# Patient Record
Sex: MALE | Race: WHITE | Employment: UNEMPLOYED | URBAN - METROPOLITAN AREA
[De-identification: names, ages, dates, MRNs, and addresses within clinical notes are randomized per-mention and may not be internally consistent; named-entity substitution may affect disease eponyms.]

---

## 2017-06-26 ENCOUNTER — OFFICE VISIT (OUTPATIENT)
Dept: FAMILY MEDICINE CLINIC | Age: 32
End: 2017-06-26

## 2017-06-26 VITALS
HEART RATE: 87 BPM | SYSTOLIC BLOOD PRESSURE: 132 MMHG | WEIGHT: 182 LBS | RESPIRATION RATE: 18 BRPM | BODY MASS INDEX: 26.05 KG/M2 | OXYGEN SATURATION: 100 % | HEIGHT: 70 IN | TEMPERATURE: 98.2 F | DIASTOLIC BLOOD PRESSURE: 77 MMHG

## 2017-06-26 DIAGNOSIS — A41.9 SEPSIS, DUE TO UNSPECIFIED ORGANISM: Primary | ICD-10-CM

## 2017-06-26 DIAGNOSIS — F11.20 HEROIN ADDICTION (HCC): ICD-10-CM

## 2017-06-26 NOTE — PROGRESS NOTES
HISTORY OF PRESENT ILLNESS  Neal Martínez is a 33 y.o. male. Chief Complaint   Patient presents with    ED Follow-up     Seen at [facility] on 6-22-17, and 6-24-17, and left AMA. DX with positive blood culture, fever, cough, H/A, and Heroin use. Pt refuses hospital admission.  Shoulder Pain     bilateral with pain scale of 7/10       HPI  Pt here for f/u of ER visits at [facility].   Pt had initially sought care on 6/22/17 due to ha. He had a sz while there and after eval, was d/c'ed to home with instructions to f/u with pcp and neuro. He was called back due to + blood cx. Pt left AMA from his 2nd visit but was started on oral abx. Pt presents here today only concerned about his shoulder pain. Pt is accompanied by mother who would like him to address elevated bp readings. Pt became upset with his mother initially and asked [facility] are you here? \"  Pt advised to speak more respectfully to his mother as she obviously cares about his wellbeing. Pt advised that I will not prescribe pain meds. He states that he has not been using marijuana but will have to use it and \"violate\" if not given a prescription for pain meds. Pt was clearly unhappy about this. Pt advised that he needs to return to the hospital for appropriate tx of his sepsis to prevent septic shock and death. Pt declines. Pt left the exam room and headed to the front of the ofc. Pt's mother was left in the room with me and began to express her concerns. She thinks he will not go in the hospital due to concerns about going through withdrawal from heroin. Mom advised that he would be medically treated for withdrawal sx and referred for rehab when cleared for admission, should he desire rehab. At this point pt re-entered the room and told his mother that she was not allowed to speak about him or his care. Pt's mother left with pt.     Review of Systems   Unable to perform ROS: Psychiatric disorder   Musculoskeletal: Positive for joint pain. Physical Exam   Constitutional: He appears well-developed and well-nourished. No distress. HENT:   Head: Normocephalic and atraumatic. Right Ear: External ear normal.   Left Ear: External ear normal.   Nose: Nose normal.   Eyes: Conjunctivae and EOM are normal.   Neck: Normal range of motion. Pulmonary/Chest: Effort normal.   Musculoskeletal: Normal range of motion. Neurological: He is alert. Coordination normal.   Skin: Skin is dry. Psychiatric: His speech is normal. His affect is angry. He is agitated. He is not aggressive and not combative. Cognition and memory are impaired. He expresses impulsivity. Nursing note and vitals reviewed. ASSESSMENT and PLAN  Porter West was seen today for ed follow-up and shoulder pain. Diagnoses and all orders for this visit:    Sepsis, due to unspecified organism [facility])    Heroin addiction ([facility] [address]    Attempted to convey importance of further care. Pt only interested in getting pain meds. Pt exited prior to exam.  Pt declines hospital admission.       Follow-up Disposition: unclear if pt will return

## 2017-06-26 NOTE — PROGRESS NOTES
Jerry Bernabe 33 y.o. male   Chief Complaint   Patient presents with    ED Follow-up     Seen at [facility] on 6-22-17, and 6-24-17, and left AMA. DX with positive blood culture, fever, cough, H/A, and Heroin use. Pt refuses hospital admission.  Shoulder Pain     bilateral with pain scale of 7/10         1. Have you been to the ER, urgent care clinic since your last visit? Hospitalized since your last visit? Yes When: 6/22& 6/24 Where: [facility] Reason for visit: positive blood culture, H/A, fever, drug use    2. Have you seen or consulted any other health care providers outside of the [address] [place] [address] since your last visit? Include any pap smears or colon screening.  No

## 2017-07-24 ENCOUNTER — TELEPHONE (OUTPATIENT)
Dept: FAMILY MEDICINE CLINIC | Age: 32
End: 2017-07-24

## 2017-07-24 NOTE — TELEPHONE ENCOUNTER
Pt has scheduled an appointment with Dr. Annette Bryant Infectious Disease [phone] for 8/3/17 and wants records faxed.

## 2017-12-28 ENCOUNTER — HOSPITAL ENCOUNTER (INPATIENT)
Age: 32
LOS: 2 days | Discharge: HOME | DRG: 753 | End: 2017-12-30
Attending: STUDENT IN AN ORGANIZED HEALTH CARE EDUCATION/TRAINING PROGRAM | Admitting: PSYCHIATRY & NEUROLOGY
Payer: MEDICAID

## 2017-12-28 PROBLEM — F39 MOOD DISORDER (HCC): Status: ACTIVE | Noted: 2017-12-28

## 2017-12-28 PROCEDURE — 74011250637 HC RX REV CODE- 250/637: Performed by: PSYCHIATRY & NEUROLOGY

## 2017-12-28 PROCEDURE — 65220000003 HC RM SEMIPRIVATE PSYCH

## 2017-12-28 RX ORDER — ZIPRASIDONE HYDROCHLORIDE 40 MG/1
40 CAPSULE ORAL 2 TIMES DAILY WITH MEALS
Status: DISCONTINUED | OUTPATIENT
Start: 2017-12-28 | End: 2017-12-30 | Stop reason: HOSPADM

## 2017-12-28 RX ORDER — IBUPROFEN 400 MG/1
400 TABLET ORAL
Status: DISCONTINUED | OUTPATIENT
Start: 2017-12-28 | End: 2017-12-30 | Stop reason: HOSPADM

## 2017-12-28 RX ORDER — LORAZEPAM 1 MG/1
1-2 TABLET ORAL
Status: DISCONTINUED | OUTPATIENT
Start: 2017-12-28 | End: 2017-12-30 | Stop reason: HOSPADM

## 2017-12-28 RX ORDER — HALOPERIDOL 5 MG/1
5 TABLET ORAL
Status: DISCONTINUED | OUTPATIENT
Start: 2017-12-28 | End: 2017-12-30 | Stop reason: HOSPADM

## 2017-12-28 RX ORDER — HALOPERIDOL 5 MG/ML
5 INJECTION INTRAMUSCULAR
Status: DISCONTINUED | OUTPATIENT
Start: 2017-12-28 | End: 2017-12-30 | Stop reason: HOSPADM

## 2017-12-28 RX ORDER — LORAZEPAM 2 MG/ML
1-2 INJECTION INTRAMUSCULAR
Status: DISCONTINUED | OUTPATIENT
Start: 2017-12-28 | End: 2017-12-30 | Stop reason: HOSPADM

## 2017-12-28 RX ORDER — TRAZODONE HYDROCHLORIDE 50 MG/1
50 TABLET ORAL
Status: DISCONTINUED | OUTPATIENT
Start: 2017-12-28 | End: 2017-12-30 | Stop reason: HOSPADM

## 2017-12-28 RX ORDER — CLONAZEPAM 0.5 MG/1
1 TABLET ORAL 2 TIMES DAILY
Status: DISCONTINUED | OUTPATIENT
Start: 2017-12-28 | End: 2017-12-30 | Stop reason: HOSPADM

## 2017-12-28 RX ADMIN — CLONAZEPAM 1 MG: 0.5 TABLET ORAL at 20:29

## 2017-12-28 RX ADMIN — QUETIAPINE FUMARATE 800 MG: 100 TABLET ORAL at 20:28

## 2017-12-28 RX ADMIN — TRAZODONE HYDROCHLORIDE 50 MG: 50 TABLET ORAL at 20:29

## 2017-12-28 RX ADMIN — LAMOTRIGINE 125 MG: 100 TABLET ORAL at 20:29

## 2017-12-28 RX ADMIN — ZIPRASIDONE HYDROCHLORIDE 40 MG: 40 CAPSULE ORAL at 17:39
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
## 2017-12-28 NOTE — IP AVS SNAPSHOT
[address] [facility] [address] 
 
 
 [address] Patient: Brijesh Taylor MRN: [id number] AZ:[birth year] About your hospitalization You were admitted on:  December 28, 2017 You last received care in the:  [facility] You were discharged on:  December 30, 2017 Why you were hospitalized Your primary diagnosis was:  Not on File Your diagnoses also included:  Mood Disorder (Hcc), Anxiety Things You Need To Do (next 8 weeks) Follow up with Martin Murdock MD  
  
Phone:  [phone] Where:  [address], [address] [facility] Follow up with Where:  Patient is scheduled with Dr. Marcial Rosales on 1/2/18 @ 6pm with [facility] [address] [facility] 4 [facility], [address] [place] [address] [id number] [place] [address],[facility] [address] Discharge Orders None A check darian indicates which time of day the medication should be taken. My Medications STOP taking these medications ADDERALL 30 mg tablet Generic drug:  dextroamphetamine-amphetamine ATIVAN 1 mg tablet Generic drug:  LORazepam  
   
  
 HYDROcodone-acetaminophen 5-325 mg per tablet Commonly known as:  NORCO  
   
  
 SEROquel  mg sr tablet Generic drug:  QUEtiapine SR Replaced by:  QUEtiapine 400 mg tablet  
   
  
 traZODone 50 mg tablet Commonly known as:  DESYREL  
   
  
 XANAX 2 mg tablet Generic drug:  ALPRAZolam  
   
  
  
TAKE these medications as instructed Instructions Each Dose to Equal  
 Morning Noon Evening Bedtime  
 lamoTRIgine 150 mg tablet Commonly known as: LaMICtal  
   
Your last dose was: Your next dose is: Take 1 Tab by mouth nightly. Indications: seizures 150 mg  
    
   
   
   
  
 prazosin 1 mg capsule Commonly known as:  MINIPRESS Your last dose was: Your next dose is: Take 1 Cap by mouth nightly. Indications: CHRONIC PTSD WITH TRAUMA NIGHTMARES  
 1 mg QUEtiapine 400 mg tablet Commonly known as:  SEROquel Your last dose was: Your next dose is: Take 2 Tabs by mouth nightly. Indications: BIPOLAR DISORDER IN REMISSION  
 800 mg  
    
   
   
   
  
 ziprasidone 80 mg capsule Commonly known as:  Neena Sears Your last dose was: Your next dose is: Take 1 Cap by mouth daily (with dinner). Indications: BIPOLAR DISORDER IN REMISSION 40 mg Where to Get Your Medications Information on where to get these meds will be given to you by the nurse or doctor. ! Ask your nurse or doctor about these medications  
  lamoTRIgine 150 mg tablet  
 prazosin 1 mg capsule QUEtiapine 400 mg tablet  
 ziprasidone 80 mg capsule Discharge Instructions BEHAVIORAL HEALTH NURSING DISCHARGE NOTE The following personal items collected during your admission are returned to you:  
Dental Appliance: Dental Appliances: None Vision: Visual Aid: None Hearing Aid:   
Jewelry: Jewelry: None Clothing:   
Other Valuables:   
Valuables sent to safe:   
 
 
PATIENT INSTRUCTIONS: 
 
The discharge information has been reviewed with the patient. The patient verbalized understanding. BEHAVIORAL HEALTH NURSING DISCHARGE NOTE The following personal items collected during your admission are returned to you:  
Dental Appliance: Dental Appliances: None Vision: Visual Aid: None Hearing Aid:   
Jewelry: Jewelry: None Clothing:   
Other Valuables:   
Valuables sent to safe:   
 
 
PATIENT INSTRUCTIONS: 
 
The discharge information has been reviewed with the patient. The patient verbalized understanding. Anxiety Disorder: Care Instructions Your Care Instructions Anxiety is a normal reaction to stress.  Difficult situations can cause you to have symptoms such as sweaty palms and a nervous feeling. In an anxiety disorder, the symptoms are far more severe. Constant worry, muscle tension, trouble sleeping, nausea and diarrhea, and other symptoms can make normal daily activities difficult or impossible. These symptoms may occur for no reason, and they can affect your work, school, or social life. Medicines, counseling, and self-care can all help. Follow-up care is a key part of your treatment and safety. Be sure to make and go to all appointments, and call your doctor if you are having problems. It's also a good idea to know your test results and keep a list of the medicines you take. How can you care for yourself at home? · Take medicines exactly as directed. Call your doctor if you think you are having a problem with your medicine. · Go to your counseling sessions and follow-up appointments. · Recognize and accept your anxiety. Then, when you are in a situation that makes you anxious, say to yourself, \"This is not an emergency. I feel uncomfortable, but I am not in danger. I can keep going even if I feel anxious. \" · Be kind to your body: ¨ Relieve tension with exercise or a massage. ¨ Get enough rest. 
¨ Avoid alcohol, caffeine, nicotine, and illegal drugs. They can increase your anxiety level and cause sleep problems. ¨ Learn and do relaxation techniques. See below for more about these techniques. · Engage your mind. Get out and do something you enjoy. Go to a funny movie, or take a walk or hike. Plan your day. Having too much or too little to do can make you anxious. · Keep a record of your symptoms. Discuss your fears with a good friend or family member, or join a support group for people with similar problems. Talking to others sometimes relieves stress. · Get involved in social groups, or volunteer to help others. Being alone sometimes makes things seem worse than they are. · Get at least 30 minutes of exercise on most days of the week to relieve stress. Walking is a good choice. You also may want to do other activities, such as running, swimming, cycling, or playing tennis or team sports. Relaxation techniques Do relaxation exercises 10 to 20 minutes a day. You can play soothing, relaxing music while you do them, if you wish. · Tell others in your house that you are going to do your relaxation exercises. Ask them not to disturb you. · Find a comfortable place, away from all distractions and noise. · Lie down on your back, or sit with your back straight. · Focus on your breathing. Make it slow and steady. · Breathe in through your nose. Breathe out through either your nose or mouth. · Breathe deeply, filling up the area between your navel and your rib cage. Breathe so that your belly goes up and down. · Do not hold your breath. · Breathe like this for 5 to 10 minutes. Notice the feeling of calmness throughout your whole body. As you continue to breathe slowly and deeply, relax by doing the following for another 5 to 10 minutes: · Tighten and relax each muscle group in your body. You can begin at your toes and work your way up to your head. · Imagine your muscle groups relaxing and becoming heavy. · Empty your mind of all thoughts. · Let yourself relax more and more deeply. · Become aware of the state of calmness that surrounds you. · When your relaxation time is over, you can bring yourself back to alertness by moving your fingers and toes and then your hands and feet and then stretching and moving your entire body. Sometimes people fall asleep during relaxation, but they usually wake up shortly afterward. · Always give yourself time to return to full alertness before you drive a car or do anything that might cause an accident if you are not fully alert. Never play a relaxation tape while you drive a car. When should you call for help? Call 911 anytime you think you may need emergency care. For example, call if: 
? · You feel you cannot stop from hurting yourself or someone else. ? Keep the numbers for these national suicide hotlines: 5-625-491-TALK (0-901.470.4988) and 4-808-DRJZIVP (7-423.244.1378). If you or someone you know talks about suicide or feeling hopeless, get help right away. ? Watch closely for changes in your health, and be sure to contact your doctor if: 
? · You have anxiety or fear that affects your life. ? · You have symptoms of anxiety that are new or different from those you had before. Where can you learn more? Go to http://abhinav-bradley.info/. Enter P754 in the search box to learn more about \"Anxiety Disorder: Care Instructions. \" Current as of: May 12, 2017 Content Version: 11.4 © 4461-4266 iXpert. Care instructions adapted under license by [employer] (which disclaims liability or warranty for this information). If you have questions about a medical condition or this instruction, always ask your healthcare professional. [place] [address] any warranty or liability for your use of this information. Learning About Mood Disorders What are mood disorders? Mood disorders are medical problems that affect how you feel. They can impact your moods, thoughts, and actions. Mood disorders include: · Depression. This causes you to feel sad or hopeless for much of the time. · Bipolar disorder. This causes extreme mood changes from manic episodes of very high energy to extreme lows of depression. · Seasonal affective disorder (SAD). This is a type of depression that affects you during the same season each year. Most often people experience SAD during the fall and winter months when days are shorter and there is less light. What are the symptoms? Depression You may: · Feel sad or hopeless nearly every day. · Lose interest in or not get pleasure from most daily activities. You feel this way nearly every day. · Have low energy, changes in your appetite, or changes in how well you sleep. · Have trouble concentrating. · Think about death and suicide. Keep the numbers for these national suicide hotlines: 1-102-827-TALK (5-959.702.3860) and 2-843-COTKRUS (2-964.128.9142). If you or someone you know talks about suicide or feeling hopeless, get help right away. Bipolar disorder Symptoms depend on your mood swings. You may: · Feel very happy, energetic, or on edge. · Feel like you need very little sleep. · Feel overly self-confident. · Do impulsive things, such as spending a lot of money. · Feel sad or hopeless. · Have racing thoughts or trouble thinking and making decisions. · Lose interest in things you have enjoyed in the past. 
· Think about death and suicide. Keep the numbers for these national suicide hotlines: 4-302-133-TALK (4-132.828.3005) and 3-249-YAWLWYP (6-477.424.6451). If you or someone you know talks about suicide or feeling hopeless, get help right away. Seasonal affective disorder (SAD) Symptoms come and go at about the same time each year. For most people with SAD, symptoms come during the winter when there is less daylight. You may: · Feel sad, grumpy, posadas, or anxious. · Lose interest in your usual activities. · Eat more and crave carbohydrates, such as bread and pasta. · Gain weight. · Sleep more and feel drowsy during the daytime. How are mood disorders treated? Mood disorders can be treated with medicines or counseling, or a combination of both. Medicines for depression and SAD may include antidepressants. Medicines for bipolar disorder may include: · Mood stabilizers. · Antipsychotics. · Benzodiazepines. Counseling may involve cognitive-behavioral therapy. It teaches you how to change the ways you think and behave.  This can help you stop thinking bad thoughts about yourself and your life. Light therapy is the main treatment for SAD. This therapy uses a special kind of lamp. You let the lamp shine on you at certain times, usually in the morning. This may help your symptoms during the months when there is less sunlight. Healthy lifestyle Healthy lifestyle changes may help you feel better. · Get at least 30 minutes of exercise on most days of the week. Walking is a good choice. · Eat a healthy diet. Include fruits, vegetables, lean proteins, and whole grains in your diet each day. · Keep a regular sleep schedule. Try for 8 hours of sleep a night. · Find ways to manage stress, such as relaxation exercises. · Avoid alcohol and illegal drugs. Follow-up care is a key part of your treatment and safety. Be sure to make and go to all appointments, and call your doctor if you are having problems. It's also a good idea to know your test results and keep a list of the medicines you take. Where can you learn more? Go to http://abhinav-bradley.info/. Enter S584 in the search box to learn more about \"Learning About Mood Disorders. \" Current as of: May 12, 2017 Content Version: 11.4 © 7418-4985 Envysion. Care instructions adapted under license by [employer] (which disclaims liability or warranty for this information). If you have questions about a medical condition or this instruction, always ask your healthcare professional. [address] any warranty or liability for your use of this information. Introducing [facility]! Teresa Roland introduces [employer] patient portal. Now you can access parts of your medical record, email your doctor's office, and request medication refills online. 1. In your internet browser, go to [email or web address] 2. Click on the First Time User? Click Here link in the Sign In box. You will see the New Member Sign Up page. 3. Enter your Zones Access Code exactly as it appears below. You will not need to use this code after youve completed the sign-up process. If you do not sign up before the expiration date, you must request a new code. · Zones Access Code: [email or web address] Expires: 3/29/2018  7:33 AM 
 
4. Enter the last four digits of your Social Security Number (xxxx) and Date of Birth (mm/dd/yyyy) as indicated and click Submit. You will be taken to the next sign-up page. 5. Create a Zones ID. This will be your Zones login ID and cannot be changed, so think of one that is secure and easy to remember. 6. Create a Zones password. You can change your password at any time. 7. Enter your Password Reset Question and Answer. This can be used at a later time if you forget your password. 8. Enter your e-mail address. You will receive e-mail notification when new information is available in [address] 9. Click Sign Up. You can now view and download portions of your medical record. 10. Click the Download Summary menu link to download a portable copy of your medical information. If you have questions, please visit the Frequently Asked Questions section of the Zones website. Remember, Zones is NOT to be used for urgent needs. For medical emergencies, dial 911. Now available from your iPhone and Android! Providers Seen During Your Hospitalization Provider Specialty Primary office phone Lamont Manley MD Psychiatry [phone] Your Primary Care Physician (PCP) Primary Care Physician Office Phone Office Fax [facility] [phone] You are allergic to the following No active allergies Recent Documentation Smoking Status Current Every Day Smoker Emergency Contacts Name Discharge Info Relation Home Work Mobile [address] CAREGIVER [3] Spouse [3] [phone] [address]  Other Relative [6] [phone] Stephanie Bentley  Friend [5] [phone] Patient Belongings The following personal items are in your possession at time of discharge: 
  Dental Appliances: None  Visual Aid: None      Home Medications: None   Jewelry: None Please provide this summary of care documentation to your next provider. Signatures-by signing, you are acknowledging that this After Visit Summary has been reviewed with you and you have received a copy. Patient Signature:  ____________________________________________________________ Date:  ____________________________________________________________  
  
[facility] Sleeper Provider Signature:  ____________________________________________________________ Date:  ____________________________________________________________

## 2017-12-29 PROCEDURE — 74011250637 HC RX REV CODE- 250/637: Performed by: PSYCHIATRY & NEUROLOGY

## 2017-12-29 PROCEDURE — 74011250637 HC RX REV CODE- 250/637: Performed by: STUDENT IN AN ORGANIZED HEALTH CARE EDUCATION/TRAINING PROGRAM

## 2017-12-29 PROCEDURE — 65220000003 HC RM SEMIPRIVATE PSYCH

## 2017-12-29 RX ORDER — PRAZOSIN HYDROCHLORIDE 1 MG/1
1 CAPSULE ORAL
Status: DISCONTINUED | OUTPATIENT
Start: 2017-12-29 | End: 2017-12-30 | Stop reason: HOSPADM

## 2017-12-29 RX ADMIN — HALOPERIDOL 5 MG: 5 TABLET ORAL at 10:27

## 2017-12-29 RX ADMIN — CLONAZEPAM 1 MG: 0.5 TABLET ORAL at 08:40

## 2017-12-29 RX ADMIN — HALOPERIDOL 5 MG: 5 TABLET ORAL at 05:30

## 2017-12-29 RX ADMIN — QUETIAPINE FUMARATE 800 MG: 100 TABLET ORAL at 20:07

## 2017-12-29 RX ADMIN — ZIPRASIDONE HYDROCHLORIDE 40 MG: 40 CAPSULE ORAL at 17:00

## 2017-12-29 RX ADMIN — CLONAZEPAM 1 MG: 0.5 TABLET ORAL at 20:06

## 2017-12-29 RX ADMIN — ZIPRASIDONE HYDROCHLORIDE 40 MG: 40 CAPSULE ORAL at 08:40

## 2017-12-29 RX ADMIN — LORAZEPAM 1 MG: 1 TABLET ORAL at 16:27

## 2017-12-29 RX ADMIN — LAMOTRIGINE 125 MG: 100 TABLET ORAL at 20:06

## 2017-12-29 RX ADMIN — PRAZOSIN HYDROCHLORIDE 1 MG: 1 CAPSULE ORAL at 20:08
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
## 2017-12-29 NOTE — BSMART NOTE
Patient states he feels he is doing well and would like to be discharged. States his Father visited him last night and all went well. He will be returning to Parents home after discharged. Patient stated he spent several days at [facility] prior to his transfer here and feels he is now stable for discharge.

## 2017-12-29 NOTE — BSMART NOTE
SOCIAL WORK GROUP THERAPY PROGRESS NOTE    Group Time:  1:15pm    Group Topic:  Coping Skills    Group Participation:     Pt expressed not interested in attending session despite staff support

## 2017-12-29 NOTE — PROGRESS NOTES
Patient has been pleasant today. Request haldol earlier this morning . States \" it helps me stay calm\". Social with select peers.

## 2017-12-29 NOTE — BSMART NOTE
ART THERAPY GROUP PROGRESS NOTE    PATIENT SCHEDULED FOR GROUP AT: 13:45    ATTENDANCE: Full    PARTICIPATION LEVEL: Participates fully in the art process    ATTENTION LEVEL: Able to focus on task    FOCUS: Reality orientation     SYMBOLIC & THEMATIC CONTENT AS NOTED IN IMAGERY: He was calm, compliant, and invested in the task at hand. He occasionally interacted with group members and offered encouragement. His imagery was a bit bizarre and associations were general with a vague quality.

## 2017-12-29 NOTE — BH NOTES
GROUP THERAPY PROGRESS NOTE    Eloise Sheldon is participating in [place].      Group time: 30 minutes    Personal goal for participation: have good day    Goal orientation: community    Group therapy participation: minimal    Therapeutic interventions reviewed and discussed: goals and procedures    Impression of participation: encouraged

## 2017-12-29 NOTE — BH NOTES
Patient made aware of medication left at [facility] and to notify them and that he needs to pick his medication up in 2 weeks, the patient stated ok.

## 2017-12-29 NOTE — BSMART NOTE
OCCUPATIONAL THERAPY PROGRESS NOTE    Group Time:  2117  Attendance: The patient attended 1/2 of group. Called out. Participation:  The patient participated with moderate elaboration in the activity. Attention:  The patient needed redirection to activity at least once. Interaction:  The patient occasionally  interacts with others. Participated as called on. Answers appropriate to question.

## 2017-12-29 NOTE — BH NOTES
GROUP THERAPY PROGRESS NOTE    Malinda Chau is participating in Recreational Therapy. Group time: 30 minutes    Goal orientation: social    Group therapy participation: active    Therapeutic interventions reviewed and discussed: Snack and movies in day room. Impression of participation: Patient participated in day room.

## 2017-12-30 VITALS
HEART RATE: 80 BPM | SYSTOLIC BLOOD PRESSURE: 112 MMHG | RESPIRATION RATE: 16 BRPM | DIASTOLIC BLOOD PRESSURE: 72 MMHG | TEMPERATURE: 96.8 F

## 2017-12-30 PROBLEM — F41.9 ANXIETY: Status: ACTIVE | Noted: 2017-12-30

## 2017-12-30 PROCEDURE — 74011250637 HC RX REV CODE- 250/637: Performed by: PSYCHIATRY & NEUROLOGY

## 2017-12-30 RX ORDER — QUETIAPINE FUMARATE 400 MG/1
800 TABLET, FILM COATED ORAL
Qty: 60 TAB | Refills: 0 | Status: SHIPPED | OUTPATIENT
Start: 2017-12-30

## 2017-12-30 RX ORDER — ZIPRASIDONE HYDROCHLORIDE 80 MG/1
40 CAPSULE ORAL
Qty: 30 CAP | Refills: 0 | Status: SHIPPED | OUTPATIENT
Start: 2017-12-30

## 2017-12-30 RX ORDER — LAMOTRIGINE 150 MG/1
150 TABLET ORAL
Qty: 30 TAB | Refills: 0 | Status: SHIPPED | OUTPATIENT
Start: 2017-12-30

## 2017-12-30 RX ORDER — PRAZOSIN HYDROCHLORIDE 1 MG/1
1 CAPSULE ORAL
Qty: 30 CAP | Refills: 0 | Status: SHIPPED | OUTPATIENT
Start: 2017-12-30

## 2017-12-30 RX ADMIN — LORAZEPAM 1 MG: 1 TABLET ORAL at 09:34

## 2017-12-30 RX ADMIN — CLONAZEPAM 1 MG: 0.5 TABLET ORAL at 08:12

## 2017-12-30 RX ADMIN — ZIPRASIDONE HYDROCHLORIDE 40 MG: 40 CAPSULE ORAL at 08:12

## 2017-12-30 RX ADMIN — LORAZEPAM 2 MG: 1 TABLET ORAL at 06:25

## 2017-12-30 NOTE — BH NOTES
BEHAVIORAL HEALTH NURSING DISCHARGE NOTE      No personal items collected during your admission are returned to you: Items were picked up by family member 12/29/17 per patient by father. PATIENT INSTRUCTIONS:  Crisis Hotline: 8-708.725.2152. [facility] Emergency Services: [phone]. The discharge information has been reviewed with the patient. The patient verbalized understanding.       Patient armband removed and shredded

## 2017-12-30 NOTE — BH NOTES
Patient Participated in group today, he consumed all his food and took his medication he did not display any negative or aggressive behavior staff will continue to monitor patient for health and safety.

## 2017-12-30 NOTE — DISCHARGE INSTRUCTIONS
BEHAVIORAL HEALTH NURSING DISCHARGE NOTE      The following personal items collected during your admission are returned to you:   Dental Appliance: Dental Appliances: None  Vision: Visual Aid: None  Hearing Aid:    Jewelry: Jewelry: None  Clothing:    Other Valuables:    Valuables sent to safe:        PATIENT INSTRUCTIONS:    The discharge information has been reviewed with the patient. The patient verbalized understanding. BEHAVIORAL HEALTH NURSING DISCHARGE NOTE      The following personal items collected during your admission are returned to you:   Dental Appliance: Dental Appliances: None  Vision: Visual Aid: None  Hearing Aid:    Jewelry: Jewelry: None  Clothing:    Other Valuables:    Valuables sent to safe:        PATIENT INSTRUCTIONS:    The discharge information has been reviewed with the patient. The patient verbalized understanding. Anxiety Disorder: Care Instructions  Your Care Instructions    Anxiety is a normal reaction to stress. Difficult situations can cause you to have symptoms such as sweaty palms and a nervous feeling. In an anxiety disorder, the symptoms are far more severe. Constant worry, muscle tension, trouble sleeping, nausea and diarrhea, and other symptoms can make normal daily activities difficult or impossible. These symptoms may occur for no reason, and they can affect your work, school, or social life. Medicines, counseling, and self-care can all help. Follow-up care is a key part of your treatment and safety. Be sure to make and go to all appointments, and call your doctor if you are having problems. It's also a good idea to know your test results and keep a list of the medicines you take. How can you care for yourself at home? · Take medicines exactly as directed. Call your doctor if you think you are having a problem with your medicine. · Go to your counseling sessions and follow-up appointments. · Recognize and accept your anxiety.  Then, when you are in a situation that makes you anxious, say to yourself, \"This is not an emergency. I feel uncomfortable, but I am not in danger. I can keep going even if I feel anxious. \"  · Be kind to your body:  ¨ Relieve tension with exercise or a massage. ¨ Get enough rest.  ¨ Avoid alcohol, caffeine, nicotine, and illegal drugs. They can increase your anxiety level and cause sleep problems. ¨ Learn and do relaxation techniques. See below for more about these techniques. · Engage your mind. Get out and do something you enjoy. Go to a [employer] movie, or take a walk or hike. Plan your day. Having too much or too little to do can make you anxious. · Keep a record of your symptoms. Discuss your fears with a good friend or family member, or join a support group for people with similar problems. Talking to others sometimes relieves stress. · Get involved in social groups, or volunteer to help others. Being alone sometimes makes things seem worse than they are. · Get at least 30 minutes of exercise on most days of the week to relieve stress. Walking is a good choice. You also may want to do other activities, such as running, swimming, cycling, or playing tennis or team sports. Relaxation techniques  Do relaxation exercises 10 to 20 minutes a day. You can play soothing, relaxing music while you do them, if you wish. · Tell others in your house that you are going to do your relaxation exercises. Ask them not to disturb you. · Find a comfortable place, away from all distractions and noise. · Lie down on your back, or sit with your back straight. · Focus on your breathing. Make it slow and steady. · Breathe in through your nose. Breathe out through either your nose or mouth. · Breathe deeply, filling up the area between your navel and your rib cage. Breathe so that your belly goes up and down. · Do not hold your breath. · Breathe like this for 5 to 10 minutes. Notice the feeling of calmness throughout your whole body.   As you continue to breathe slowly and deeply, relax by doing the following for another 5 to 10 minutes:  · Tighten and relax each muscle group in your body. You can begin at your toes and work your way up to your head. · Imagine your muscle groups relaxing and becoming heavy. · Empty your mind of all thoughts. · Let yourself relax more and more deeply. · Become aware of the state of calmness that surrounds you. · When your relaxation time is over, you can bring yourself back to alertness by moving your fingers and toes and then your hands and feet and then stretching and moving your entire body. Sometimes people fall asleep during relaxation, but they usually wake up shortly afterward. · Always give yourself time to return to full alertness before you drive a car or do anything that might cause an accident if you are not fully alert. Never play a relaxation tape while you drive a car. When should you call for help? Call 911 anytime you think you may need emergency care. For example, call if:  ? · You feel you cannot stop from hurting yourself or someone else. ? Keep the numbers for these national suicide hotlines: 5-799-623-TALK (7-791.355.9115) and 1-936-TFUILMO (6-457.379.5464). If you or someone you know talks about suicide or feeling hopeless, get help right away. ? Watch closely for changes in your health, and be sure to contact your doctor if:  ? · You have anxiety or fear that affects your life. ? · You have symptoms of anxiety that are new or different from those you had before. Where can you learn more? Go to http://abhinav-bradley.info/. Enter P754 in the search box to learn more about \"Anxiety Disorder: Care Instructions. \"  Current as of: May 12, 2017  Content Version: 11.4  © 7888-7584 Healthwise, Incorporated. Care instructions adapted under license by [employer] (which disclaims liability or warranty for this information).  If you have questions about a medical condition or this instruction, always ask your healthcare professional. [address] any warranty or liability for your use of this information. Learning About Mood Disorders  What are mood disorders? Mood disorders are medical problems that affect how you feel. They can impact your moods, thoughts, and actions. Mood disorders include:  · Depression. This causes you to feel sad or hopeless for much of the time. · Bipolar disorder. This causes extreme mood changes from manic episodes of very high energy to extreme lows of depression. · Seasonal affective disorder (SAD). This is a type of depression that affects you during the same season each year. Most often people experience SAD during the fall and winter months when days are shorter and there is less light. What are the symptoms? Depression  You may:  · Feel sad or hopeless nearly every day. · Lose interest in or not get pleasure from most daily activities. You feel this way nearly every day. · Have low energy, changes in your appetite, or changes in how well you sleep. · Have trouble concentrating. · Think about death and suicide. Keep the numbers for these national suicide hotlines: 5-270-106-TALK (7-709-585-657.524.7725) and 3-656-FKWGUQA (4-702.343.2918). If you or someone you know talks about suicide or feeling hopeless, get help right away. Bipolar disorder  Symptoms depend on your mood swings. You may:  · Feel very happy, energetic, or on edge. · Feel like you need very little sleep. · Feel overly self-confident. · Do impulsive things, such as spending a lot of money. · Feel sad or hopeless. · Have racing thoughts or trouble thinking and making decisions. · Lose interest in things you have enjoyed in the past.  · Think about death and suicide. Keep the numbers for these national suicide hotlines: 1-208-637-TALK (1-850.334.2386) and 8-040-YZYJJFM (8-141.989.3451).  If you or someone you know talks about suicide or feeling hopeless, get help right away. Seasonal affective disorder (SAD)  Symptoms come and go at about the same time each year. For most people with SAD, symptoms come during the winter when there is less daylight. You may:  · Feel sad, grumpy, posadas, or anxious. · Lose interest in your usual activities. · Eat more and crave carbohydrates, such as bread and pasta. · Gain weight. · Sleep more and feel drowsy during the daytime. How are mood disorders treated? Mood disorders can be treated with medicines or counseling, or a combination of both. Medicines for depression and SAD may include antidepressants. Medicines for bipolar disorder may include:  · Mood stabilizers. · Antipsychotics. · Benzodiazepines. Counseling may involve cognitive-behavioral therapy. It teaches you how to change the ways you think and behave. This can help you stop thinking bad thoughts about yourself and your life. Light therapy is the main treatment for SAD. This therapy uses a special kind of lamp. You let the lamp shine on you at certain times, usually in the morning. This may help your symptoms during the months when there is less sunlight. Healthy lifestyle  Healthy lifestyle changes may help you feel better. · Get at least 30 minutes of exercise on most days of the week. Walking is a good choice. · Eat a healthy diet. Include fruits, vegetables, lean proteins, and whole grains in your diet each day. · Keep a regular sleep schedule. Try for 8 hours of sleep a night. · Find ways to manage stress, such as relaxation exercises. · Avoid alcohol and illegal drugs. Follow-up care is a key part of your treatment and safety. Be sure to make and go to all appointments, and call your doctor if you are having problems. It's also a good idea to know your test results and keep a list of the medicines you take. Where can you learn more? Go to http://abhinav-bradley.info/.   Enter R862 in the search box to learn more about \"Learning About Mood Disorders. \"  Current as of: May 12, 2017  Content Version: 11.4  © 3187-6354 Healthwise, Incorporated. Care instructions adapted under license by [employer] (which disclaims liability or warranty for this information). If you have questions about a medical condition or this instruction, always ask your healthcare professional. [place] [address] any warranty or liability for your use of this information.

## 2017-12-30 NOTE — PROGRESS NOTES
Problem: Suicide/Homicide (Adult/Pediatric)  Goal: *STG: Remains safe in hospital  Patient will remain free of harm to self and others every shift throughout hospitalization. Outcome: Progressing Towards Goal  Demonstrated safe behavior on unit; denied thoughts of harm to self or others. Goal: *STG/LTG: Complies with medication therapy  Patient will take medication as prescribed every shift throughout hospital stay. Outcome: Progressing Towards Goal  Patient is medication compliant. Comments: Patient is alert and oriented x 4; denied thoughts of harm to self or others; social with peers and staff; received Ativan 1 mg po x 1 this shift for c/o anxiety; will monitor and maintain safe environment.

## 2017-12-30 NOTE — BH NOTES
GROUP THERAPY PROGRESS NOTE    Michelle Ravi is participating in [employer].      Group time: 30 minutes    Personal goal for participation: discuss guideline compliance, unit issues and community announcements    Goal orientation: community    Group therapy participation: active
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
## 2018-04-11 ENCOUNTER — OFFICE VISIT (OUTPATIENT)
Dept: CARDIOLOGY CLINIC | Age: 33
End: 2018-04-11

## 2018-04-11 VITALS
BODY MASS INDEX: 27.22 KG/M2 | HEIGHT: 69 IN | WEIGHT: 183.8 LBS | HEART RATE: 92 BPM | DIASTOLIC BLOOD PRESSURE: 90 MMHG | SYSTOLIC BLOOD PRESSURE: 128 MMHG

## 2018-04-11 DIAGNOSIS — F17.200 SMOKER: ICD-10-CM

## 2018-04-11 DIAGNOSIS — R07.89 OTHER CHEST PAIN: Primary | ICD-10-CM

## 2018-04-11 RX ORDER — ASPIRIN 81 MG/1
TABLET ORAL DAILY
COMMUNITY

## 2018-04-11 RX ORDER — ALPRAZOLAM 2 MG/1
TABLET ORAL
COMMUNITY

## 2018-04-11 RX ORDER — FLUOXETINE HYDROCHLORIDE 20 MG/1
CAPSULE ORAL DAILY
COMMUNITY

## 2018-04-11 NOTE — MR AVS SNAPSHOT
[facility] 
 
 
 [address] [facility] 
[phone] Patient: Daphnie Fournier MRN: [id number] Visit Information Date & Time Provider Department Dept. Phone Encounter #  
 2018  3:00 PM Chencho Dewitt MD [facility] [phone] Follow-up Instructions Return in about 3 weeks (around 2018). Your Appointments 2018  3:00 PM  
New Patient with Chencho Dewitt MD  
[facility] ([address] [facility]) Appt Note: Self referral pt c/o arm and leg numbess and chest pressure; appt conf/aj  
 [address] [facility] [address]  
  
   
 [address] [place] [address] Upcoming Health Maintenance Date Due Pneumococcal 19-64 Medium Risk (1 of 1 - PPSV23) 3/3/2005 DTaP/Tdap/Td series (1 - Tdap) 3/3/2007 Allergies as of 2018  Review Complete On: 2018 By: Steven Matson No Known Allergies Current Immunizations  Never Reviewed No immunizations on file. Not reviewed this visit You Were Diagnosed With   
  
 Codes Comments Other chest pain    -  Primary ICD-10-CM: R07.89 ICD-9-CM: 786.59 Smoker     ICD-10-CM: R44.577 ICD-9-CM: 305.1 Vitals BP Pulse Height(growth percentile) Weight(growth percentile) BMI Smoking Status 128/90 92 5' 9\" (1.753 m) 183 lb 12.8 oz (83.4 kg) 27.14 kg/m2 Current Every Day Smoker Vitals History BMI and BSA Data Body Mass Index Body Surface Area  
 27.14 kg/m 2 2.01 m 2 Preferred Pharmacy Pharmacy Name Phone [facility], [address] [phone] Your Updated Medication List  
  
   
This list is accurate as of 18  9:31 AM.  Always use your most recent med list.  
  
  
  
  
 aspirin delayed-release 81 mg tablet Take  by mouth daily. lamoTRIgine 150 mg tablet Commonly known as: LaMICtal  
Take 1 Tab by mouth nightly. Indications: seizures  
  
 prazosin 1 mg capsule Commonly known as:  MINIPRESS Take 1 Cap by mouth nightly. Indications: CHRONIC PTSD WITH TRAUMA NIGHTMARES  
  
 PROzac 20 mg capsule Generic drug:  FLUoxetine Take  by mouth daily. QUEtiapine 400 mg tablet Commonly known as:  SEROquel Take 2 Tabs by mouth nightly. Indications: BIPOLAR DISORDER IN REMISSION  
  
 XANAX 2 mg tablet Generic drug:  ALPRAZolam  
Take  by mouth. ziprasidone 80 mg capsule Commonly known as:  Johney Faith Take 1 Cap by mouth daily (with dinner). Indications: BIPOLAR DISORDER IN REMISSION We Performed the Following AMB POC EKG ROUTINE W/ 12 LEADS, INTER & REP [70871 CPT(R)] Follow-up Instructions Return in about 3 weeks (around 5/2/2018). To-Do List   
 04/18/2018 Imaging:  XR RIBS LT UNI 2 V Patient Instructions Have chest xray done Follow up May 2, 2018 
11 am 
 
 
  
Introducing [facility]! Ginette Carroll introduces [employer] patient portal. Now you can access parts of your medical record, email your doctor's office, and request medication refills online. 1. In your internet browser, go to [email or web address] 2. Click on the First Time User? Click Here link in the Sign In box. You will see the New Member Sign Up page. 3. Enter your [employer] Access Code exactly as it appears below. You will not need to use this code after youve completed the sign-up process. If you do not sign up before the expiration date, you must request a new code. · [employer] Access Code: [email or web address] Expires: 7/10/2018  8:14 AM 
 
4. Enter the last four digits of your Social Security Number (xxxx) and Date of Birth (mm/dd/yyyy) as indicated and click Submit. You will be taken to the next sign-up page. 5. Create a [employer] ID.  This will be your [employer] login ID and cannot be changed, so think of one that is secure and easy to remember. 6. Create a [employer] password. You can change your password at any time. 7. Enter your Password Reset Question and Answer. This can be used at a later time if you forget your password. 8. Enter your e-mail address. You will receive e-mail notification when new information is available in [address] 9. Click Sign Up. You can now view and download portions of your medical record. 10. Click the Download Summary menu link to download a portable copy of your medical information. If you have questions, please visit the Frequently Asked Questions section of the [employer] website. Remember, [employer] is NOT to be used for urgent needs. For medical emergencies, dial 911. Now available from your iPhone and Android! Please provide this summary of care documentation to your next provider. If you have any questions after today's visit, please call [phone].
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
## 2021-06-01 ENCOUNTER — OFFICE VISIT (OUTPATIENT)
Dept: FAMILY MEDICINE CLINIC | Age: 36
End: 2021-06-01
Payer: MEDICAID
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
## 2021-06-01 DIAGNOSIS — E07.9 THYROID DISORDER: ICD-10-CM

## 2021-06-01 DIAGNOSIS — Z72.0 TOBACCO ABUSE DISORDER: ICD-10-CM

## 2021-06-01 DIAGNOSIS — F31.2 BIPOLAR AFFECTIVE DISORDER, CURRENT EPISODE MANIC WITH PSYCHOTIC SYMPTOMS (HCC): ICD-10-CM

## 2021-06-01 DIAGNOSIS — R06.02 SOB (SHORTNESS OF BREATH): ICD-10-CM

## 2021-06-01 DIAGNOSIS — F39 MOOD DISORDER (HCC): ICD-10-CM

## 2021-06-01 DIAGNOSIS — F20.9 SCHIZOPHRENIA, UNSPECIFIED TYPE (HCC): ICD-10-CM

## 2021-06-01 DIAGNOSIS — R51.9 NONINTRACTABLE HEADACHE, UNSPECIFIED CHRONICITY PATTERN, UNSPECIFIED HEADACHE TYPE: Primary | ICD-10-CM

## 2021-06-01 DIAGNOSIS — E78.5 DYSLIPIDEMIA: ICD-10-CM

## 2021-06-01 DIAGNOSIS — G89.29 OTHER CHRONIC PAIN: ICD-10-CM

## 2021-06-01 PROCEDURE — 99204 OFFICE O/P NEW MOD 45 MIN: CPT | Performed by: FAMILY MEDICINE

## 2021-06-01 RX ORDER — MELOXICAM 15 MG/1
15 TABLET ORAL DAILY
COMMUNITY
Start: 2021-04-29

## 2021-06-01 RX ORDER — DOXYCYCLINE 100 MG/1
100 TABLET ORAL 2 TIMES DAILY
Qty: 20 TABLET | Refills: 0 | Status: SHIPPED | OUTPATIENT
Start: 2021-06-01 | End: 2021-06-11

## 2021-06-01 RX ORDER — BUPRENORPHINE HYDROCHLORIDE AND NALOXONE HYDROCHLORIDE DIHYDRATE 8; 2 MG/1; MG/1
1 TABLET SUBLINGUAL DAILY
COMMUNITY
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
## 2021-06-02 VITALS
SYSTOLIC BLOOD PRESSURE: 125 MMHG | RESPIRATION RATE: 18 BRPM | DIASTOLIC BLOOD PRESSURE: 82 MMHG | WEIGHT: 181 LBS | OXYGEN SATURATION: 97 % | TEMPERATURE: 97.2 F | HEIGHT: 69 IN | BODY MASS INDEX: 26.81 KG/M2 | HEART RATE: 98 BPM

## 2021-06-03 LAB
ALBUMIN SERPL-MCNC: 5.2 G/DL (ref 4–5)
ALBUMIN/GLOB SERPL: 2.6 {RATIO} (ref 1.2–2.2)
ALP SERPL-CCNC: 77 IU/L (ref 48–121)
ALT SERPL-CCNC: 159 IU/L (ref 0–44)
AST SERPL-CCNC: 78 IU/L (ref 0–40)
B BURGDOR IGG+IGM SER-ACNC: <0.91 ISR (ref 0–0.9)
BASOPHILS # BLD AUTO: 0 X10E3/UL (ref 0–0.2)
BASOPHILS NFR BLD AUTO: 0 %
BILIRUB SERPL-MCNC: 2.6 MG/DL (ref 0–1.2)
BUN SERPL-MCNC: 14 MG/DL (ref 6–20)
BUN/CREAT SERPL: 11 (ref 9–20)
CALCIUM SERPL-MCNC: 10 MG/DL (ref 8.7–10.2)
CHLORIDE SERPL-SCNC: 99 MMOL/L (ref 96–106)
CHOLEST SERPL-MCNC: 142 MG/DL (ref 100–199)
CO2 SERPL-SCNC: 19 MMOL/L (ref 20–29)
CREAT SERPL-MCNC: 1.26 MG/DL (ref 0.76–1.27)
EOSINOPHIL # BLD AUTO: 0.1 X10E3/UL (ref 0–0.4)
EOSINOPHIL NFR BLD AUTO: 1 %
ERYTHROCYTE [DISTWIDTH] IN BLOOD BY AUTOMATED COUNT: 13 % (ref 11.6–15.4)
GLOBULIN SER CALC-MCNC: 2 G/DL (ref 1.5–4.5)
GLUCOSE SERPL-MCNC: 96 MG/DL (ref 65–99)
HCT VFR BLD AUTO: 52.5 % (ref 37.5–51)
HDLC SERPL-MCNC: 41 MG/DL
HGB BLD-MCNC: 18.1 G/DL (ref 13–17.7)
IMM GRANULOCYTES # BLD AUTO: 0 X10E3/UL (ref 0–0.1)
IMM GRANULOCYTES NFR BLD AUTO: 0 %
IMP & REVIEW OF LAB RESULTS: NORMAL
LDLC SERPL CALC-MCNC: 74 MG/DL (ref 0–99)
LYMPHOCYTES # BLD AUTO: 1.3 X10E3/UL (ref 0.7–3.1)
LYMPHOCYTES NFR BLD AUTO: 12 %
MCH RBC QN AUTO: 32.6 PG (ref 26.6–33)
MCHC RBC AUTO-ENTMCNC: 34.5 G/DL (ref 31.5–35.7)
MCV RBC AUTO: 94 FL (ref 79–97)
MONOCYTES # BLD AUTO: 0.5 X10E3/UL (ref 0.1–0.9)
MONOCYTES NFR BLD AUTO: 5 %
NEUTROPHILS # BLD AUTO: 9.1 X10E3/UL (ref 1.4–7)
NEUTROPHILS NFR BLD AUTO: 82 %
PLATELET # BLD AUTO: 180 X10E3/UL (ref 150–450)
POTASSIUM SERPL-SCNC: 4.1 MMOL/L (ref 3.5–5.2)
PROT SERPL-MCNC: 7.2 G/DL (ref 6–8.5)
RBC # BLD AUTO: 5.56 X10E6/UL (ref 4.14–5.8)
SODIUM SERPL-SCNC: 135 MMOL/L (ref 134–144)
T4 FREE SERPL-MCNC: 1.28 NG/DL (ref 0.82–1.77)
TRIGL SERPL-MCNC: 159 MG/DL (ref 0–149)
TSH SERPL DL<=0.005 MIU/L-ACNC: 1.05 UIU/ML (ref 0.45–4.5)
VLDLC SERPL CALC-MCNC: 27 MG/DL (ref 5–40)
WBC # BLD AUTO: 11 X10E3/UL (ref 3.4–10.8)

## 2021-06-06 DIAGNOSIS — R74.8 ELEVATED LIVER ENZYMES: Primary | ICD-10-CM

## 2021-06-06 DIAGNOSIS — D72.829 LEUKOCYTOSIS, UNSPECIFIED TYPE: ICD-10-CM

## 2021-06-06 DIAGNOSIS — D75.1 POLYCYTHEMIA: ICD-10-CM

## 2021-06-07 NOTE — PROGRESS NOTES
Please let patient know his hemoglobin is elevated and his white blood cell count is elevated. Previous lab results in January also had his hemoglobin and white blood cell count slightly elevated. I will refer him to the hematologist for opinion. His liver enzymes are elevated. He needs to be seen by the gastroenterologist for this. We will also order liver ultrasound.   Evy Hester MD

## 2021-06-09 NOTE — PROGRESS NOTES
Attempted to contact patient. Patient didn't answer at this time. Patient was left a voice message to call the office back.

## 2021-06-10 ENCOUNTER — TELEPHONE (OUTPATIENT)
Dept: FAMILY MEDICINE CLINIC | Age: 36
End: 2021-06-10

## 2021-06-11 ENCOUNTER — HOSPITAL ENCOUNTER (OUTPATIENT)
Dept: ULTRASOUND IMAGING | Age: 36
Discharge: HOME | End: 2021-06-11
Attending: FAMILY MEDICINE
Payer: MEDICAID

## 2021-06-11 DIAGNOSIS — R74.8 ELEVATED LIVER ENZYMES: ICD-10-CM

## 2021-06-11 PROCEDURE — 76705 ECHO EXAM OF ABDOMEN: CPT

## 2021-06-14 NOTE — TELEPHONE ENCOUNTER
Attempted to contact patient back. Patient didn't answer at this time. Patent was left a voice message.

## 2021-06-15 NOTE — PROGRESS NOTES
Please let patient know abdominal ultrasound does show features of fatty liver. He also has an area in the gallbladder that is could be gallstone or polyp. I have referred him to the gastroenterologist for follow-up care and further management. He should set up an appointment and follow-up with them.   Kenneth Kahn MD

## 2021-07-27 ENCOUNTER — OFFICE VISIT (OUTPATIENT)
Dept: FAMILY MEDICINE CLINIC | Age: 36
End: 2021-07-27
Payer: MEDICAID

## 2021-07-27 VITALS
HEIGHT: 69 IN | DIASTOLIC BLOOD PRESSURE: 84 MMHG | TEMPERATURE: 96.3 F | RESPIRATION RATE: 18 BRPM | HEART RATE: 102 BPM | SYSTOLIC BLOOD PRESSURE: 109 MMHG | WEIGHT: 186 LBS | OXYGEN SATURATION: 98 % | BODY MASS INDEX: 27.55 KG/M2

## 2021-07-27 DIAGNOSIS — Z11.59 NEED FOR HEPATITIS C SCREENING TEST: ICD-10-CM

## 2021-07-27 DIAGNOSIS — D72.829 LEUKOCYTOSIS, UNSPECIFIED TYPE: ICD-10-CM

## 2021-07-27 DIAGNOSIS — R51.9 NONINTRACTABLE HEADACHE, UNSPECIFIED CHRONICITY PATTERN, UNSPECIFIED HEADACHE TYPE: ICD-10-CM

## 2021-07-27 DIAGNOSIS — G40.909 SEIZURE DISORDER (HCC): ICD-10-CM

## 2021-07-27 DIAGNOSIS — F31.2 BIPOLAR AFFECTIVE DISORDER, CURRENT EPISODE MANIC WITH PSYCHOTIC SYMPTOMS (HCC): ICD-10-CM

## 2021-07-27 DIAGNOSIS — R10.11 RUQ PAIN: Primary | ICD-10-CM

## 2021-07-27 DIAGNOSIS — G89.29 OTHER CHRONIC PAIN: ICD-10-CM

## 2021-07-27 DIAGNOSIS — E78.5 DYSLIPIDEMIA: ICD-10-CM

## 2021-07-27 PROCEDURE — 99214 OFFICE O/P EST MOD 30 MIN: CPT | Performed by: FAMILY MEDICINE
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
## 2021-08-02 ENCOUNTER — TELEPHONE (OUTPATIENT)
Dept: FAMILY MEDICINE CLINIC | Age: 36
End: 2021-08-02

## 2021-08-02 NOTE — TELEPHONE ENCOUNTER
Pt's mother want to know if Dr Emperatriz Vera recommend this pt to get the COVID vaccine because he has a polyp on his gallbladder.  Please be advised and contact back when available at [phone]

## 2021-08-03 ENCOUNTER — TRANSCRIBE ORDER (OUTPATIENT)
Dept: SCHEDULING | Age: 36
End: 2021-08-03

## 2021-08-03 DIAGNOSIS — R10.9 STOMACH ACHE: Primary | ICD-10-CM

## 2021-08-26 DIAGNOSIS — E07.9 THYROID DISORDER: ICD-10-CM
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
## 2021-08-26 DIAGNOSIS — E78.5 DYSLIPIDEMIA: ICD-10-CM
